# Patient Record
Sex: FEMALE | Race: BLACK OR AFRICAN AMERICAN | NOT HISPANIC OR LATINO | URBAN - METROPOLITAN AREA
[De-identification: names, ages, dates, MRNs, and addresses within clinical notes are randomized per-mention and may not be internally consistent; named-entity substitution may affect disease eponyms.]

---

## 2023-04-03 ENCOUNTER — OFFICE VISIT (OUTPATIENT)
Dept: URBAN - METROPOLITAN AREA CLINIC 25 | Facility: CLINIC | Age: 48
End: 2023-04-03

## 2023-04-03 RX ORDER — LISINOPRIL 10 MG/1
TABLET ORAL
Qty: 0 | Refills: 0 | Status: ACTIVE | COMMUNITY
Start: 1900-01-01 | End: 1900-01-01

## 2023-04-03 NOTE — HPI-TODAY'S VISIT:
April 23 visit: Labs from February 2023 revealed hemoglobin 11.2, low MCV 65, normal LFTs.  Patient was referred for colon cancer screening by PCP Dr. Danielito Lara. Previously seen in November 2016 with descending colon diverticulitis complicated by abscess formation responded well to antibiotics, a colonoscopy was recommended at that time but not done